# Patient Record
Sex: FEMALE | Race: WHITE
[De-identification: names, ages, dates, MRNs, and addresses within clinical notes are randomized per-mention and may not be internally consistent; named-entity substitution may affect disease eponyms.]

---

## 2017-02-13 ENCOUNTER — HOSPITAL ENCOUNTER (OUTPATIENT)
Dept: HOSPITAL 45 - C.MAMM | Age: 67
Discharge: HOME | End: 2017-02-13
Attending: INTERNAL MEDICINE
Payer: COMMERCIAL

## 2017-02-13 DIAGNOSIS — Z85.3: ICD-10-CM

## 2017-02-13 DIAGNOSIS — Z12.31: Primary | ICD-10-CM

## 2017-02-13 NOTE — MAMMOGRAPHY REPORT
BILATERAL DIGITAL SCREENING MAMMOGRAM TOMOSYNTHESIS WITH CAD: 2/13/2017

CLINICAL HISTORY: Asymptomatic. Personal history of breast cancer.  





TECHNIQUE:  Breast tomosynthesis in addition to standard 2D mammography was performed. Current study
 was also evaluated with a Computer Aided Detection (CAD) system.  



COMPARISON: Comparison is made to exams dated:  2/17/2016 ultrasound, 2/17/2016 mammogram, 2/10/2016
 mammogram, 2/9/2015 mammogram, 2/7/2014 mammogram, and 1/31/2013 mammogram - [facility].   



BREAST COMPOSITION:  The tissue of both breasts is heterogeneously dense, which may obscure small ma
sses.  



FINDINGS:  No suspicious masses, calcifications, or areas of architectural distortion are noted in e
ither breast. There has been no significant interval change compared to prior exams.  There are stab
le postsurgical changes in the right upper outer quadrant from prior lumpectomy.  Scattered bilatera
l benign appearing calcifications are not significantly changed.  A biopsy marker clip is again note
d in the left upper inner quadrant.  Small round circumscribed benign-appearing right breast masses 
are not significantly changed.

 



IMPRESSION:  ACR BI-RADS CATEGORY 2: BENIGN

There is no mammographic evidence of malignancy. A 1 year screening mammogram is recommended.  The p
atient will receive written notification of the results.  





Approximately 10% of breast cancers are not detected with mammography. A negative mammographic repor
t should not delay biopsy if a clinically suggestive mass is present.



Awa Booker M.D.          

/:2/13/2017 14:45:23  



Imaging Technologist: Laura Ojeda, [facility]

letter sent: Normal 1/2  

BI-RADS Code: ACR BI-RADS Category 2: Benign

## 2017-07-06 ENCOUNTER — HOSPITAL ENCOUNTER (OUTPATIENT)
Dept: HOSPITAL 45 - C.PATHSPEC | Age: 67
Discharge: HOME | End: 2017-07-06
Attending: DERMATOLOGY
Payer: COMMERCIAL

## 2017-07-06 DIAGNOSIS — D22.71: Primary | ICD-10-CM

## 2017-07-20 ENCOUNTER — HOSPITAL ENCOUNTER (OUTPATIENT)
Dept: HOSPITAL 45 - C.PATHSPEC | Age: 67
Discharge: HOME | End: 2017-07-20
Attending: DERMATOLOGY
Payer: COMMERCIAL

## 2017-07-20 DIAGNOSIS — D22.72: Primary | ICD-10-CM

## 2017-09-11 ENCOUNTER — HOSPITAL ENCOUNTER (OUTPATIENT)
Dept: HOSPITAL 45 - C.PATHSPEC | Age: 67
Discharge: HOME | End: 2017-09-11
Attending: DERMATOLOGY
Payer: COMMERCIAL

## 2017-09-11 DIAGNOSIS — L57.0: Primary | ICD-10-CM

## 2017-10-05 ENCOUNTER — HOSPITAL ENCOUNTER (OUTPATIENT)
Dept: HOSPITAL 45 - C.PATHSPEC | Age: 67
Discharge: HOME | End: 2017-10-05
Attending: OBSTETRICS & GYNECOLOGY
Payer: COMMERCIAL

## 2017-10-05 DIAGNOSIS — L90.0: Primary | ICD-10-CM

## 2018-02-14 ENCOUNTER — HOSPITAL ENCOUNTER (OUTPATIENT)
Dept: HOSPITAL 45 - C.MAMM | Age: 68
Discharge: HOME | End: 2018-02-14
Attending: OBSTETRICS & GYNECOLOGY
Payer: COMMERCIAL

## 2018-02-14 DIAGNOSIS — Z12.31: Primary | ICD-10-CM

## 2018-02-14 DIAGNOSIS — Z85.3: ICD-10-CM

## 2018-02-14 NOTE — MAMMOGRAPHY REPORT
BILATERAL DIGITAL SCREENING MAMMOGRAM TOMOSYNTHESIS WITH CAD: 2/14/2018

CLINICAL HISTORY: Asymptomatic. Personal history of breast cancer.  





TECHNIQUE:  Breast tomosynthesis in addition to standard 2D mammography was performed. Current study 
was also evaluated with a Computer Aided Detection (CAD) system.  



COMPARISON: Comparison is made to exams dated:  2/13/2017 mammogram, 2/10/2016 mammogram, 2/9/2015 ma
mmogram, 2/7/2014 mammogram, 1/31/2013 mammogram, and 1/26/2012 mammogram - [facility].   



BREAST COMPOSITION:  The tissue of both breasts is heterogeneously dense, which may obscure small mas
ses.  



FINDINGS:  No suspicious masses, calcifications, or areas of architectural distortion are noted in ei
ther breast. There has been no significant interval change compared to prior exams.  There are stable
 postsurgical changes in the right upper outer quadrant from prior lumpectomy.  Scattered bilateral b
enign appearing calcifications are not significantly changed.  A biopsy marker clip is again noted in
 the left upper inner quadrant.  Round circumscribed benign-appearing 9 mm mass within the right uppe
r inner quadrant is stable compared to multiple prior exams.





IMPRESSION:  ACR BI-RADS CATEGORY 2: BENIGN

There is no mammographic evidence of malignancy. A 1 year screening mammogram is recommended.  The pa
tient will receive written notification of the results.  





Approximately 10% of breast cancers are not detected with mammography. A negative mammographic report
 should not delay biopsy if a clinically suggestive mass is present.



Awa Booker M.D.          

/:2/14/2018 12:40:18  



Imaging Technologist: Kelly STERN(R)(M), [facility]

letter sent: Normal 1/2  

BI-RADS Code: ACR BI-RADS Category 2: Benign

## 2018-08-21 ENCOUNTER — HOSPITAL ENCOUNTER (OUTPATIENT)
Dept: HOSPITAL 45 - C.GI | Age: 68
Discharge: HOME | End: 2018-08-21
Attending: INTERNAL MEDICINE
Payer: COMMERCIAL

## 2018-08-21 VITALS
HEIGHT: 65.51 IN | BODY MASS INDEX: 26.39 KG/M2 | WEIGHT: 160.34 LBS | WEIGHT: 160.34 LBS | BODY MASS INDEX: 26.39 KG/M2 | HEIGHT: 65.51 IN

## 2018-08-21 VITALS — DIASTOLIC BLOOD PRESSURE: 80 MMHG | SYSTOLIC BLOOD PRESSURE: 132 MMHG | OXYGEN SATURATION: 97 % | HEART RATE: 65 BPM

## 2018-08-21 DIAGNOSIS — K64.8: ICD-10-CM

## 2018-08-21 DIAGNOSIS — K57.30: ICD-10-CM

## 2018-08-21 DIAGNOSIS — Z85.820: ICD-10-CM

## 2018-08-21 DIAGNOSIS — Z87.891: ICD-10-CM

## 2018-08-21 DIAGNOSIS — K21.9: ICD-10-CM

## 2018-08-21 DIAGNOSIS — Z12.11: Primary | ICD-10-CM

## 2018-08-21 DIAGNOSIS — Z85.3: ICD-10-CM

## 2018-08-21 DIAGNOSIS — Z88.2: ICD-10-CM

## 2018-08-21 NOTE — ANESTHESIOLOGY PROGRESS NOTE
Anesthesia Post Op Note


Date & Time


Aug 21, 2018 at 12:08





Vital Signs


Pain Intensity:  0





Vital Signs Past 12 Hours








  Date Time  Temp Pulse Resp B/P (MAP) Pulse Ox O2 Delivery O2 Flow Rate FiO2


 


8/21/18 12:02  67 18 117/71 (86) 96 Room Air  


 


8/21/18 11:47 36.7 82 16 117/61 (79) 96 Room Air  


 


8/21/18 10:41 36.6 83 20 151/97 (115) 97 Room Air  











Notes


Mental Status:  alert / awake / arousable, participated in evaluation


Pt Amnestic to Procedure:  Yes


Nausea / Vomiting:  adequately controlled


Pain:  adequately controlled


Airway Patency, RR, SpO2:  stable & adequate


BP & HR:  stable & adequate


Hydration State:  stable & adequate


Anesthetic Complications:  no major complications apparent

## 2018-08-21 NOTE — ENDO HISTORY AND PHYSICAL
History & Physical


Date of Service:


Aug 21, 2018.


Chief Complaint:


screening


Referring Physician:


Dr. Conde


History of Present Illness


68 yo CF who presents for screening colonoscopy.





Past Surgical History


Hx Cardiac Surgery:  No


Hx Internal Defibrillator:  No


Hx Pacemaker:  No


Hx Abdominal Surgery:  No


Hx of Implantable Prosthesis:  No


Hx Post-Op Nausea and Vomiting:  No


Hx Cancer Surgery:  Yes (MELANOMA 1979, BREAST CA 2000)


Hx Thoracic Surgery:  No


Hx Orthopedic:  Yes (CLAVICLE 1956/2005, R TKA)


Hx Urinary Tract Surgery:  No





Family History


None





Social History


Smoking Status:  Former Smoker


Hx Substance Use:  No


Hx Alcohol Use:  Yes (1-2 GLASSES MONTHLY)





Allergies


Coded Allergies:  


     Sulfa Drugs (Verified  Allergy, Mild, MUSCLE ATROPHY, 8/21/18)





Current Medications





Reported Home Medications








 Medications  Dose


 Route/Sig


 Max Daily Dose Days Date Category


 


 Stool Softener


  (Sennosides-Docusate


 Sodium) 1 Tab Tab  50 Mg


 PO DAILY


    8/14/18 Reported


 


 Vitamin B12


  (Cyanocobalamin)


 1,000 Mcg Tab  1 Tab


 PO DAILY


    8/14/18 Reported


 


 Vitamin C


  (Ascorbic Acid)


 500 Mg Tab  1 Tab


 PO DAILY


    8/14/18 Reported


 


 Omeprazole 20 Mg


 Tab  1 Tab


 PO DAILY


    8/14/18 Reported


 


 Magnesium


  (Magnesium Oxide


  (Mg Supplement)


 250 Mg Tab  1 Tab


 PO DAILY


    8/14/18 Reported


 


 Vitamin D


  (Cholecalciferol)


 1,000 Unit Tab  1 Cap


 PO BID


    8/14/18 Reported


 


 Cranberry Ultra


 Strength 250-60


 mg


  (Cranberry-Vitamin


 C) 1 Cap Cap  4,200 Mcg


 PO DAILY


    8/14/18 Reported


 


 Clobetasol


 Propionate 45


 Appln/15 Gm Oint  1 Appln


 TOP WK


    8/14/18 Reported











Vital Signs


Weight (Kilograms):  72.73


Height (Feet):  5


Height (Inches):  5.5











  Date Time  Temp Pulse Resp B/P (MAP) Pulse Ox O2 Delivery O2 Flow Rate FiO2


 


8/21/18 10:41 36.6 83 20 151/97 (115) 97 Room Air  











Physical Exam


General Appearance:  WD/WN, no apparent distress


Respiratory/Chest:  


   Auscultation:  breath sounds normal


Cardiovascular:  


   Heart Auscultation:  RRR


Abdomen:  


   Bowel Sounds:  normal


   Inspection & Palpation:  soft, non-distended, no tenderness, guarding & 

rebound





Assessment and Plan


Assessment:


68 yo CF who presents for screening colonoscopy.








Plan:


Proceed with colonoscopy.

## 2018-08-21 NOTE — DISCHARGE INSTRUCTIONS
Endoscopy Patient Instructions


Date / Procedure(s) Performed


Aug 21, 2018.


Colonoscopy





Allergy Information


Coded Allergies:  


     Sulfa Drugs (Verified  Allergy, Mild, MUSCLE ATROPHY, 8/21/18)





Discharge Date / Findings


Aug 21, 2018.


Diverticulosis


Internal hemorrhoids





Medication Instructions


OK to resume all medications today as prescribed





Reported Home Medications








 Medications  Dose


 Route/Sig


 Max Daily Dose Days Date Category


 


 Stool Softener


  (Sennosides-Docusate


 Sodium) 1 Tab Tab  50 Mg


 PO DAILY


    8/14/18 Reported


 


 Vitamin B12


  (Cyanocobalamin)


 1,000 Mcg Tab  1 Tab


 PO DAILY


    8/14/18 Reported


 


 Vitamin C


  (Ascorbic Acid)


 500 Mg Tab  1 Tab


 PO DAILY


    8/14/18 Reported


 


 Omeprazole 20 Mg


 Tab  1 Tab


 PO DAILY


    8/14/18 Reported


 


 Magnesium


  (Magnesium Oxide


  (Mg Supplement)


 250 Mg Tab  1 Tab


 PO DAILY


    8/14/18 Reported


 


 Vitamin D


  (Cholecalciferol)


 1,000 Unit Tab  1 Cap


 PO BID


    8/14/18 Reported


 


 Cranberry Ultra


 Strength 250-60


 mg


  (Cranberry-Vitamin


 C) 1 Cap Cap  4,200 Mcg


 PO DAILY


    8/14/18 Reported


 


 Clobetasol


 Propionate 45


 Appln/15 Gm Oint  1 Appln


 TOP WK


    8/14/18 Reported











Provider Instructions





Activity Restrictions





-  No exercising or heavy lifting for 24 hours. 


-  Do not drink alcohol the day of the procedure.


-  Do not drive a car or operate machinery until the day after the procedure.


-  Do not make any important decisions or sign important papers in 24 hours 

after the procedure.





Following Day:





-  Return to full activity which may include returning to work/school.





Diet





Start your diet with liquids and light foods (jello, soup, juice, toast).  Then 

eat your usual diet if not nauseated.





Treatment For Common After Affects





For mild abdominal pain, bloating, or excessive gas:





-  Rest


-  Eat lightly


-  Lie on right side





Follow-Up Information


Follow-up with Dr. Conde as scheduled





Anesthesia Information





What You Should Know





You have had a procedure that required some medicine to reduce anxiety and 

discomfort. This treatment is called moderate sedation.  


After receiving the treatment, you may be sleepy, but you will be able to 

breathe on your own.  The effects of the treatment may last for several hours.








Follow these instructions along with Activity/Diet recommendations noted above:





*  Do NOT do anything where dizziness or clumsiness would be dangerous.





*  Rest quietly at home today, then you can be up and about tomorrow.





*  Have a responsible person stay with you the rest of today.





*  You may have had an I.V. today.  If so, you may take the dressing off later 

today.





Recommendations


 


Call your doctor if:





*  Trouble breathing 





*  Continuous vomiting for more than 24 hours








*  Temperature above 101 degrees





*  Severe abdominal pain or bloating





*  Pain not relieved by pain medicine ordered





*  There is increased drainage or redness from any incision





*  A large amount of rectal bleeding greater than 2-3 tablespoons. 


   (If you had a polyp/s removed or have hemorrhoids, a small amount of blood -


    from the rectum is to be expected.)





*  You have any unanswered questions or concerns.








IN THE EVENT OF A SERIOUS EMERGENCY, GO TO THE NEAREST EMERGENCY ROOM








       Your discharge instructions were prepared by provider Acosta Ortiz.





 Patient Instructions Signature Page














Lillian Sylvester 











Patient (or Guardian) Signature/Date:____________________________________ I 

have read and understand the instructions given to me by my caregivers.








Caregiver/RN/Doctor Signature/Date:____________________________________











The above-named patient and/or guardian has received patient instructions on 

this date.





























+  Original Patient Signature Page (only) stays with chart.  Please make copy 

for patient.

## 2018-08-21 NOTE — GI REPORT
Patient Name: Lillian Sylvester

Procedure Date: 8/21/2018 10:58 AM

MRN: [id number]

Account Number: [id number]

YOB: 1950

Admit Type: Outpatient

Age: 67

Gender: Female

Attending MD: Acosta Ortiz DO

Procedure:            Colonoscopy

Providers:            Acosta Ortiz DO

Referring MD:         Sterling Conde

Indications:          Screening for colorectal malignant neoplasm

Medicines:            Monitored Anesthesia Care

Complications:        No immediate complications.

Estimated Blood Loss: Estimated blood loss: none.

Procedure:            Pre-Anesthesia Assessment:

                      - Prior to the procedure, a History and Physical was 

                      performed, and patient medications and allergies were 

                      reviewed. The patient's tolerance of previous 

                      anesthesia was also reviewed. The risks and benefits of 

                      the procedure and the sedation options and risks were 

                      discussed with the patient. All questions were 

                      answered, and informed consent was obtained. Prior 

                      Anticoagulants: The patient has taken no previous 

                      anticoagulant or antiplatelet agents. ASA Grade 

                      Assessment: II - A patient with mild systemic disease. 

                      After reviewing the risks and benefits, the patient was 

                      deemed in satisfactory condition to undergo the 

                      procedure.

                      After I obtained informed consent, the scope was passed 

                      under direct vision. Throughout the procedure, the 

                      patient's blood pressure, pulse, and oxygen saturations 

                      were monitored continuously. The scope was introduced 

                      through the anus and advanced to the terminal ileum. 

                      The colonoscopy was performed without difficulty. The 

                      patient tolerated the procedure well. The quality of 

                      the bowel preparation was good. The terminal ileum, 

                      ileocecal valve, appendiceal orifice, and rectum were 

                      photographed.

Findings:

     The perianal and digital rectal examinations were normal.

     Multiple small-mouthed diverticula were found in the sigmoid colon.

     Non-bleeding internal hemorrhoids were found during retroflexion. The 

     hemorrhoids were small.

Impression:           - Diverticulosis in the sigmoid colon.

                      - Non-bleeding internal hemorrhoids.

                      - No specimens collected.

Recommendation:       - Resume previous diet.

                      - Continue present medications.

                      - Repeat colonoscopy in 10 years for surveillance.

                      - Return to primary care physician as previously 

                      scheduled.

Acosta Ortiz DO

8/21/2018 11:53:14 AM

This report has been signed electronically.

Note Initiated On: 8/21/2018 10:58 AM

Number of Addenda: 0

     I attest to the content of the Intraoperative Record and orders 

     documented therein, exceptions below



{[id number]}